# Patient Record
Sex: MALE
[De-identification: names, ages, dates, MRNs, and addresses within clinical notes are randomized per-mention and may not be internally consistent; named-entity substitution may affect disease eponyms.]

---

## 2023-07-06 ENCOUNTER — APPOINTMENT (OUTPATIENT)
Dept: ORTHOPEDIC SURGERY | Facility: CLINIC | Age: 43
End: 2023-07-06
Payer: COMMERCIAL

## 2023-07-06 VITALS — HEIGHT: 72 IN | WEIGHT: 230 LBS | BODY MASS INDEX: 31.15 KG/M2

## 2023-07-06 DIAGNOSIS — S46.911A STRAIN OF UNSPECIFIED MUSCLE, FASCIA AND TENDON AT SHOULDER AND UPPER ARM LEVEL, RIGHT ARM, INITIAL ENCOUNTER: ICD-10-CM

## 2023-07-06 DIAGNOSIS — I10 ESSENTIAL (PRIMARY) HYPERTENSION: ICD-10-CM

## 2023-07-06 PROBLEM — Z00.00 ENCOUNTER FOR PREVENTIVE HEALTH EXAMINATION: Status: ACTIVE | Noted: 2023-07-06

## 2023-07-06 PROCEDURE — 73030 X-RAY EXAM OF SHOULDER: CPT | Mod: RT

## 2023-07-06 PROCEDURE — 99202 OFFICE O/P NEW SF 15 MIN: CPT | Mod: 25

## 2023-07-06 RX ORDER — LOSARTAN POTASSIUM 100 MG/1
100 TABLET, FILM COATED ORAL
Refills: 0 | Status: ACTIVE | COMMUNITY

## 2023-07-06 NOTE — HISTORY OF PRESENT ILLNESS
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 Soreness for years due to pitching and paying baseball. Hx of right shoulder "impingement in past treated with injection which resolved pain\par \par PMHx HTN [] : no [FreeTextEntry1] : right shoulder  [FreeTextEntry5] : pt has played baseball his whole life, very active with the right shoulder. pt states then recently he moved in bed and felt a pop in his shoulder  [FreeTextEntry9] : tylenol or aleve  [de-identified] : movement  [de-identified] : a few years ago

## 2023-07-06 NOTE — IMAGING
[de-identified] : right shoulder:\par no swelling/ecchymosis\par painful IR\par no ttp over proximal biceps\par no pain with resisted supination or FF\par negative empty can\par no pain with resisted IR or ER\par negative posterior lift off\par 5/5 strength\par nvid [Right] : right shoulder [There are no fractures, subluxations or dislocations. No significant abnormalities are seen] : There are no fractures, subluxations or dislocations. No significant abnormalities are seen